# Patient Record
Sex: FEMALE | Race: WHITE | NOT HISPANIC OR LATINO | Employment: UNEMPLOYED | URBAN - METROPOLITAN AREA
[De-identification: names, ages, dates, MRNs, and addresses within clinical notes are randomized per-mention and may not be internally consistent; named-entity substitution may affect disease eponyms.]

---

## 2024-01-02 ENCOUNTER — OFFICE VISIT (OUTPATIENT)
Dept: ENDOCRINOLOGY | Facility: CLINIC | Age: 44
End: 2024-01-02
Payer: COMMERCIAL

## 2024-01-02 VITALS
DIASTOLIC BLOOD PRESSURE: 82 MMHG | HEART RATE: 87 BPM | OXYGEN SATURATION: 97 % | SYSTOLIC BLOOD PRESSURE: 130 MMHG | BODY MASS INDEX: 36.71 KG/M2 | WEIGHT: 187 LBS | HEIGHT: 60 IN

## 2024-01-02 DIAGNOSIS — E03.9 HYPOTHYROIDISM (ACQUIRED): ICD-10-CM

## 2024-01-02 DIAGNOSIS — E04.2 MULTIPLE THYROID NODULES: Primary | ICD-10-CM

## 2024-01-02 DIAGNOSIS — E06.3 HASHIMOTO'S THYROIDITIS: ICD-10-CM

## 2024-01-02 DIAGNOSIS — E66.01 CLASS 2 SEVERE OBESITY DUE TO EXCESS CALORIES WITH SERIOUS COMORBIDITY AND BODY MASS INDEX (BMI) OF 36.0 TO 36.9 IN ADULT: ICD-10-CM

## 2024-01-02 PROCEDURE — 84443 ASSAY THYROID STIM HORMONE: CPT | Performed by: INTERNAL MEDICINE

## 2024-01-02 PROCEDURE — 99204 OFFICE O/P NEW MOD 45 MIN: CPT | Performed by: INTERNAL MEDICINE

## 2024-01-02 PROCEDURE — 1160F RVW MEDS BY RX/DR IN RCRD: CPT | Performed by: INTERNAL MEDICINE

## 2024-01-02 PROCEDURE — 1159F MED LIST DOCD IN RCRD: CPT | Performed by: INTERNAL MEDICINE

## 2024-01-02 RX ORDER — GABAPENTIN 300 MG/1
1 CAPSULE ORAL 2 TIMES DAILY
COMMUNITY
Start: 2015-09-02

## 2024-01-02 RX ORDER — THYROID, PORCINE 120 MG/1
120 TABLET ORAL DAILY
COMMUNITY
Start: 2007-10-02 | End: 2024-01-03 | Stop reason: DRUGHIGH

## 2024-01-02 RX ORDER — TIZANIDINE 4 MG/1
TABLET ORAL
COMMUNITY
Start: 2023-07-21

## 2024-01-02 NOTE — ASSESSMENT & PLAN NOTE
We discussed use of GLP-1 RA.  She is using Wegovy.  She has lost about 8 pounds.    We discussed screening for Cushing's syndrome with 24 hour urine free cortisol.  Lab slip provided for this.

## 2024-01-02 NOTE — ASSESSMENT & PLAN NOTE
We discussed the diagnosis and association with thyroid nodules and hypothyroidism.  We discussed risk for other autoimmune disorders.

## 2024-01-02 NOTE — PROGRESS NOTES
Office Note      Date: 2024  Patient Name: Mercy Grace  MRN: [id number]  : 1980    Chief Complaint   Patient presents with     Multiple thyroid nodules       History of Present Illness:   Mercy Grace is a 43 y.o. female who presents for  Multiple thyroid nodules    She has h/o hypothyroidism in  after a pregnancy.  She has noted weight gain.  She reports being treated with Synthroid and T4 but had some palpitations.  She has been treated with armour thyroid.  She is taking 120mg daily.  She is taking this correctly.  She isn't taking any interfering meds concurrently.  She had labs done 2023 that showed TSH of 2.03.  She notes fatigue.  Her main complaint is inability to lose weight despite diet and exercise.  She took adipex and lost 20 pounds but it caused palpitations and she stopped it.      She reports h/o thyroid nodules since around .  She hasn't had FNA done until recently.  She had neck u/s done 2023.  This showed bilateral thyroid nodules with a dominant 2.9cm nodule in the left lower pole.  She hasn't noted any change in the size of her neck.  She denies any compressive sxs.    Subjective      Patient was born where: [place].  Facial radiation exposure: No.  High iodine intake: No  Family hx of thyroid disease: Yes, describe: MGGM, MGF.    Review of Systems:   Review of Systems   Constitutional:  Positive for fatigue.   Eyes:  Positive for photophobia.   Respiratory: Negative.     Cardiovascular: Negative.    Gastrointestinal: Negative.    Endocrine: Positive for cold intolerance.   Genitourinary: Negative.    Musculoskeletal:  Positive for neck stiffness.   Skin: Negative.    Allergic/Immunologic: Negative.    Neurological:  Positive for headaches.   Hematological: Negative.    Psychiatric/Behavioral: Negative.         The following portions of the patient's history were reviewed and updated as appropriate: allergies, current medications, past family history, past medical  "history, past social history, past surgical history, and problem list.    Objective     Visit Vitals  /82   Pulse 87   Ht 152.4 cm (60\")   Wt 84.8 kg (187 lb)   SpO2 97%   BMI 36.52 kg/m²       Physical Exam:  Physical Exam  Constitutional:       Appearance: Normal appearance.   HENT:      Head: Normocephalic and atraumatic.   Eyes:      Extraocular Movements: Extraocular movements intact.      Conjunctiva/sclera: Conjunctivae normal.      Pupils: Pupils are equal, round, and reactive to light.   Neck:      Thyroid: No thyroid mass, thyromegaly or thyroid tenderness.   Cardiovascular:      Rate and Rhythm: Normal rate and regular rhythm.      Pulses: Normal pulses.      Heart sounds: Normal heart sounds.   Pulmonary:      Effort: Pulmonary effort is normal.      Breath sounds: Normal breath sounds.   Abdominal:      General: Bowel sounds are normal.      Palpations: Abdomen is soft.   Musculoskeletal:         General: Normal range of motion.      Cervical back: Normal range of motion and neck supple.   Lymphadenopathy:      Cervical: No cervical adenopathy.   Skin:     General: Skin is warm and dry.   Neurological:      General: No focal deficit present.      Mental Status: She is alert.   Psychiatric:         Mood and Affect: Mood normal.         Behavior: Behavior normal.         Thought Content: Thought content normal.         Judgment: Judgment normal.         Labs:    TSH  No results found for: \"TSHBASE\"     Free T4  No results found for: \"FREET4\"    T3  No results found for: \"C5TSJIE\"      TPO  No results found for: \"THYROIDAB\"    TG AB  No results found for: \"THGAB\"    TG  No results found for: \"THYROGLB\"    CBC w/DIFF  Lab Results   Component Value Date    WBC 7.86 04/14/2022    RBC 4.38 04/14/2022    HGB 11.9 04/14/2022    HCT 37.2 04/14/2022    MCV 85 04/14/2022    MCH 27.2 04/14/2022    MCHC 32.0 04/14/2022    RDW 13.2 04/14/2022    MPV 10.4 04/14/2022     04/14/2022    NRBC 0.0 04/14/2022 "           Assessment / Plan      Assessment & Plan:  Diagnoses and all orders for this visit:    1. Multiple thyroid nodules (Primary)  Assessment & Plan:  She has multiple thyroid nodules with dominant nodule in the lower left.  It was 2.9cm.  We discussed risk of malignancy.  We discussed options including observation vs FNA vs surgery.  FNA was recommended for the larger nodule.  She reports this was done by ENT last month in [place].  She reports this was benign.      Orders:  -     TSH; Future    2. Hypothyroidism (acquired)  Assessment & Plan:  Continue armour thyroid.  Check TSH today.    Orders:  -     TSH; Future    3. Hashimoto's thyroiditis  Assessment & Plan:  We discussed the diagnosis and association with thyroid nodules and hypothyroidism.  We discussed risk for other autoimmune disorders.      4. Class 2 severe obesity due to excess calories with serious comorbidity and body mass index (BMI) of 36.0 to 36.9 in adult  Assessment & Plan:  We discussed use of GLP-1 RA.  She is using Wegovy.  She has lost about 8 pounds.    We discussed screening for Cushing's syndrome with 24 hour urine free cortisol.  Lab slip provided for this.    Orders:  -     Cortisol, Urine, Free 24Hr - Urine, Clean Catch; Future       Current Outpatient Medications   Medication Instructions    Martin Thyroid 120 mg, Oral, Daily    gabapentin (NEURONTIN) 300 MG capsule 1 capsule, Oral, 2 Times Daily    tiZANidine (ZANAFLEX) 4 MG tablet       Return in about 6 months (around 7/2/2024) for Recheck with TSH, neck u/s.    Drake Ferrer MD   01/02/2024

## 2024-01-02 NOTE — ASSESSMENT & PLAN NOTE
She has multiple thyroid nodules with dominant nodule in the lower left.  It was 2.9cm.  We discussed risk of malignancy.  We discussed options including observation vs FNA vs surgery.  FNA was recommended for the larger nodule.  She reports this was done by ENT last month in [place].  She reports this was benign.

## 2024-01-03 DIAGNOSIS — E03.9 HYPOTHYROIDISM (ACQUIRED): Primary | ICD-10-CM

## 2024-01-03 LAB — TSH SERPL DL<=0.05 MIU/L-ACNC: 0.25 UIU/ML (ref 0.27–4.2)

## 2024-01-03 RX ORDER — THYROID,PORK 90 MG
90 TABLET ORAL DAILY
Qty: 90 TABLET | Refills: 3 | Status: SHIPPED | OUTPATIENT
Start: 2024-01-03

## 2024-01-05 ENCOUNTER — PATIENT ROUNDING (BHMG ONLY) (OUTPATIENT)
Dept: ENDOCRINOLOGY | Facility: CLINIC | Age: 44
End: 2024-01-05
Payer: COMMERCIAL

## 2024-01-05 NOTE — PROGRESS NOTES
A [employer] message has been sent to the patient for patient rounding with [facility]